# Patient Record
Sex: MALE | Race: AMERICAN INDIAN OR ALASKA NATIVE
[De-identification: names, ages, dates, MRNs, and addresses within clinical notes are randomized per-mention and may not be internally consistent; named-entity substitution may affect disease eponyms.]

---

## 2018-04-16 ENCOUNTER — HOSPITAL ENCOUNTER (OUTPATIENT)
Dept: HOSPITAL 14 - H.ER | Age: 53
Setting detail: OBSERVATION
LOS: 1 days | Discharge: HOME | End: 2018-04-17
Attending: FAMILY MEDICINE | Admitting: FAMILY MEDICINE
Payer: MEDICAID

## 2018-04-16 VITALS — OXYGEN SATURATION: 95 %

## 2018-04-16 VITALS — BODY MASS INDEX: 35.6 KG/M2

## 2018-04-16 DIAGNOSIS — Z72.0: ICD-10-CM

## 2018-04-16 DIAGNOSIS — J45.909: ICD-10-CM

## 2018-04-16 DIAGNOSIS — D69.6: ICD-10-CM

## 2018-04-16 DIAGNOSIS — Z23: ICD-10-CM

## 2018-04-16 DIAGNOSIS — I82.411: ICD-10-CM

## 2018-04-16 DIAGNOSIS — I10: ICD-10-CM

## 2018-04-16 DIAGNOSIS — I82.431: ICD-10-CM

## 2018-04-16 DIAGNOSIS — L03.115: Primary | ICD-10-CM

## 2018-04-16 LAB
ALBUMIN SERPL-MCNC: 3.9 G/DL (ref 3.5–5)
ALBUMIN/GLOB SERPL: 1 {RATIO} (ref 1–2.1)
ALT SERPL-CCNC: 50 U/L (ref 21–72)
AST SERPL-CCNC: 50 U/L (ref 17–59)
BASOPHILS # BLD AUTO: 0.1 K/UL (ref 0–0.2)
BASOPHILS NFR BLD: 1.1 % (ref 0–2)
BUN SERPL-MCNC: 15 MG/DL (ref 9–20)
CALCIUM SERPL-MCNC: 9.2 MG/DL (ref 8.4–10.2)
EOSINOPHIL # BLD AUTO: 0.4 K/UL (ref 0–0.7)
EOSINOPHIL NFR BLD: 5.4 % (ref 0–4)
ERYTHROCYTE [DISTWIDTH] IN BLOOD BY AUTOMATED COUNT: 16.3 % (ref 11.5–14.5)
GFR NON-AFRICAN AMERICAN: > 60
HGB BLD-MCNC: 13.9 G/DL (ref 12–18)
LYMPHOCYTES # BLD AUTO: 2.8 K/UL (ref 1–4.3)
LYMPHOCYTES NFR BLD AUTO: 37.9 % (ref 20–40)
MCH RBC QN AUTO: 27.4 PG (ref 27–31)
MCHC RBC AUTO-ENTMCNC: 33.5 G/DL (ref 33–37)
MCV RBC AUTO: 82 FL (ref 80–94)
MONOCYTES # BLD: 0.5 K/UL (ref 0–0.8)
MONOCYTES NFR BLD: 6.5 % (ref 0–10)
NEUTROPHILS # BLD: 3.6 K/UL (ref 1.8–7)
NEUTROPHILS NFR BLD AUTO: 49.1 % (ref 50–75)
NRBC BLD AUTO-RTO: 0.2 % (ref 0–0)
PLATELET # BLD: 129 K/UL (ref 130–400)
PMV BLD AUTO: 8.1 FL (ref 7.2–11.7)
RBC # BLD AUTO: 5.07 MIL/UL (ref 4.4–5.9)
WBC # BLD AUTO: 7.4 K/UL (ref 4.8–10.8)

## 2018-04-16 PROCEDURE — 71250 CT THORAX DX C-: CPT

## 2018-04-16 PROCEDURE — 86148 ANTI-PHOSPHOLIPID ANTIBODY: CPT

## 2018-04-16 PROCEDURE — 90732 PPSV23 VACC 2 YRS+ SUBQ/IM: CPT

## 2018-04-16 PROCEDURE — 85730 THROMBOPLASTIN TIME PARTIAL: CPT

## 2018-04-16 PROCEDURE — 90471 IMMUNIZATION ADMIN: CPT

## 2018-04-16 PROCEDURE — 80053 COMPREHEN METABOLIC PANEL: CPT

## 2018-04-16 PROCEDURE — 93005 ELECTROCARDIOGRAM TRACING: CPT

## 2018-04-16 PROCEDURE — 93971 EXTREMITY STUDY: CPT

## 2018-04-16 PROCEDURE — 87040 BLOOD CULTURE FOR BACTERIA: CPT

## 2018-04-16 PROCEDURE — 10021 FNA BX W/O IMG GDN 1ST LES: CPT

## 2018-04-16 PROCEDURE — 85610 PROTHROMBIN TIME: CPT

## 2018-04-16 PROCEDURE — 86147 CARDIOLIPIN ANTIBODY EA IG: CPT

## 2018-04-16 PROCEDURE — 85025 COMPLETE CBC W/AUTO DIFF WBC: CPT

## 2018-04-16 PROCEDURE — 36415 COLL VENOUS BLD VENIPUNCTURE: CPT

## 2018-04-16 PROCEDURE — 99284 EMERGENCY DEPT VISIT MOD MDM: CPT

## 2018-04-16 PROCEDURE — 86146 BETA-2 GLYCOPROTEIN ANTIBODY: CPT

## 2018-04-16 PROCEDURE — 81240 F2 GENE: CPT

## 2018-04-16 PROCEDURE — 81241 F5 GENE: CPT

## 2018-04-16 PROCEDURE — 96372 THER/PROPH/DIAG INJ SC/IM: CPT

## 2018-04-16 NOTE — CP.PCM.CON
History of Present Illness





- History of Present Illness


History of Present Illness: 





52 year old male with a history of HTN, presenting for right lower extremity 

swelling, found to have right superfical femoral and popliteal DVT.  The 

patient reports to a boil on his foot which left him limping for about 2 weeks.

  He felt this led to swelling and discomfort at his calf and thigh.  He denies 

abnormal bleeding and clotting in the past.  He denies long travel or trauma to 

his leg.





Past medical history:  HTN





Past surgical history:  Right leg orthopedic surgery related to childhood 

accident





Family history:  Mother, brother and sisters with blood clots





Social history:  1ppd x 35 years, denies alcohol, and illicit drug use.





Allergies:  NKA





Review of systems:  All remaining review of systems including HEENT, 

cardiovascular, respiratory, gastrointestinal, genitourinary, musculoskeletal, 

dermatologic, neurologic, and psychiatric are negative unless mentioned in the 

HPI.





Past Patient History





- Infectious Disease


Hx of Infectious Diseases: None





- Past Medical History & Family History


Past Medical History?: Yes





- Past Social History


Smoking Status: Heavy Smoker > 10 Cigarettes Daily





- CARDIAC


Hx Cardiac Disorders: Yes


Hx Hypotension: Yes





- PULMONARY


Hx Respiratory Disorders: Yes


Hx Asthma: Yes





- NEUROLOGICAL


Hx Neurological Disorder: No





- HEENT


Hx HEENT Problems: No





- RENAL


Hx Chronic Kidney Disease: No





- ENDOCRINE/METABOLIC


Hx Endocrine Disorders: No





- HEMATOLOGICAL/ONCOLOGICAL


Hx Blood Disorders: No





- INTEGUMENTARY


Hx Dermatological Problems: No





- MUSCULOSKELETAL/RHEUMATOLOGICAL


Hx Musculoskeletal Disorders: No


Hx Falls: No





- GASTROINTESTINAL


Hx Gastrointestinal Disorders: No





- GENITOURINARY/GYNECOLOGICAL


Hx Genitourinary Disorders: No





- PSYCHIATRIC


Hx Psychophysiologic Disorder: No


Hx Substance Use: No





- SURGICAL HISTORY


Hx Surgeries: No





- ANESTHESIA


Hx Anesthesia: No


Hx Anesthesia Reactions: No


Hx Malignant Hyperthermia: No


Has any member of the family had a problem w/ anesthesia?: No





Meds


Allergies/Adverse Reactions: 


 Allergies











Allergy/AdvReac Type Severity Reaction Status Date / Time


 


No Known Allergies Allergy   Verified 06/12/16 11:38














- Medications


Medications: 


 Current Medications





Albuterol (Ventolin Hfa 90 Mcg/Actuation (8 G))  2 puff IH Q6 PRN


   PRN Reason: Shortness of Breath


Enoxaparin Sodium (Lovenox)  120 mg SC Q12 MORENA


   PRN Reason: Protocol


   Last Admin: 04/16/18 21:25 Dose:  Not Given


Home Med (Buprenorphine Hcl/Naloxone Hcl [Suboxone 8 Mg-2 Mg Sl Film])  1 film 

SL Q12 Replaced by [facility]


Metoprolol Tartrate (Lopressor)  50 mg PO Q12 Replaced by [facility]


   Last Admin: 04/16/18 21:29 Dose:  50 mg


Montelukast Sodium (Singulair)  10 mg PO HS Replaced by [facility]


   Last Admin: 04/16/18 21:29 Dose:  10 mg


Pantoprazole Sodium (Protonix Ec Tab)  40 mg PO DAILY Replaced by [facility]











Physical Exam





- Head Exam


Head Exam: ATRAUMATIC





- Eye Exam


Eye Exam: Normal appearance





- ENT Exam


ENT Exam: Mucous Membranes Dry





- Respiratory Exam


Respiratory Exam: NORMAL BREATHING PATTERN





- Cardiovascular Exam


Cardiovascular Exam: +S1, +S2





- GI/Abdominal Exam


GI & Abdominal Exam: Normal Bowel Sounds





- Extremities Exam


Extremities exam: Positive for: pedal edema





- Neurological Exam


Neurological exam: Oriented x3





- Psychiatric Exam


Psychiatric exam: Normal Affect, Normal Mood





- Skin


Skin Exam: Warm





Results





- Vital Signs


Recent Vital Signs: 


 Last Vital Signs











Temp  97.6 F   04/16/18 18:44


 


Pulse  73   04/16/18 21:29


 


Resp  20   04/16/18 18:44


 


BP  112/59 L  04/16/18 21:29


 


Pulse Ox  95   04/16/18 18:44














- Labs


Result Diagrams: 


 04/16/18 14:04





 04/16/18 16:40


Labs: 


 Laboratory Results - last 24 hr











  04/16/18 04/16/18





  14:04 16:40


 


WBC  7.4 


 


RBC  5.07 


 


Hgb  13.9 


 


Hct  41.5 


 


MCV  82.0 


 


MCH  27.4 


 


MCHC  33.5 


 


RDW  16.3 H 


 


Plt Count  129 L 


 


MPV  8.1 


 


Neut % (Auto)  49.1 L 


 


Lymph % (Auto)  37.9 


 


Mono % (Auto)  6.5 


 


Eos % (Auto)  5.4 H 


 


Baso % (Auto)  1.1 


 


Neut # (Auto)  3.6 


 


Lymph # (Auto)  2.8 


 


Mono # (Auto)  0.5 


 


Eos # (Auto)  0.4 


 


Baso # (Auto)  0.1 


 


Sodium   143


 


Potassium   4.2


 


Chloride   102


 


Carbon Dioxide   27


 


Anion Gap   18


 


BUN   15


 


Creatinine   0.9


 


Est GFR ( Amer)   > 60


 


Est GFR (Non-Af Amer)   > 60


 


Random Glucose   89


 


Calcium   9.2


 


Total Bilirubin   1.0


 


AST   50


 


ALT   50


 


Alkaline Phosphatase   91


 


Total Protein   7.8


 


Albumin   3.9


 


Globulin   3.9


 


Albumin/Globulin Ratio   1.0














Assessment & Plan


(1) DVT (deep venous thrombosis)


Assessment and Plan: 


? provoked from decreased mobility from foot pain


given family history, will check inherited thrombophilia w/u


given extensive smoking history, will check CT chest to rule out occult 

malignancy


outpatient cancer surveillance with colonoscopy and PSA


on therapeutic anticoagulation


outpatient NOAC





Status: Acute   





(2) Thrombocytopenia


Assessment and Plan: 


mild


cont. to monitor


?low grade ITP


Status: Acute   





(3) Tobacco abuse


Assessment and Plan: 


smoking cessation discussed at length





Thank you for this interesting consult.


Status: Acute

## 2018-04-16 NOTE — US
PROCEDURE:  Right lower extremity venous duplex Doppler. 



HISTORY:

r/o DVT







COMPARISON:

None available.



TECHNIQUE:

Common femoral, superficial femoral, popliteal and posterior tibial 

veins were evaluated. Flow was assessed with color Doppler, 

compressibility, assessment of phasic flow and augmentation response. 



FINDINGS:



COMMON FEMORAL VEIN:

Unremarkable.



SUPERFICIAL FEMORAL VEIN:

Proximal SFV is compressible, augments and exhibits normal phasic 

color Doppler blood flow with no suspicious grayscale findings.  

However, intermediate echoes are are seen throughout the visualize 

lumen of the mid to distal SFV with no color Doppler blood flow or 

compressibility of this segment compatible with occlusive thrombosis.



POPLITEAL VEIN:

Occlusive thrombosis identified here identical to that seen in the 

mid to distal right SFV, manifest by noncompressibility, intermediate 

echogenicity in the lumen and lack of spontaneous color Doppler blood 

flow.



POSTERIOR TIBIAL VEIN:

Unremarkable.



OTHER FINDINGS:

None.



IMPRESSION:

Occlusive thrombosis at mid to distal right superficial femoral vein 

and the entire popliteal vein.



Findings discussed with Dr. Villalta 04/16/2018 4:10 p.m. with written 

down and read back verification.

## 2018-04-16 NOTE — ED PDOC
Lower Extremity Pain/Injury


Time Seen by Provider: 04/16/18 13:32


Chief Complaint (Nursing): Lower Extremity Problem/Injury


History Per: Patient


History/Exam Limitations: no limitations


Onset/Duration Of Symptoms: Other (x 1 week)


Current Symptoms Are (Timing): Still Present


Additional Complaint(s): 





52-year-old male, with a history of Hypertension, presents to ED complaining of 

swelling and redness to right calf x 1 week. Denies fever, chest pain or 

shortness of breath. No Trauma.














PMD: Betito Roberson








Past Medical History


Reviewed: Historical Data, Nursing Documentation, Vital Signs


Vital Signs: 





 Last Vital Signs











Temp  98.8 F   04/16/18 13:27


 


Pulse  82   04/16/18 13:27


 


Resp  18   04/16/18 13:27


 


BP  146/86   04/16/18 13:27


 


Pulse Ox  99   04/16/18 13:27














- Medical History


PMH: Asthma, HTN





- Surgical History


Surgical History: No Surg Hx





- Family History


Family History: States: Unknown Family Hx





- Home Medications


Home Medications: 


 Ambulatory Orders











 Medication  Instructions  Recorded


 


Albuterol Sulfate [Albuterol 3 ml IH Q4 #1 sol 08/24/14





Sulfate 2.5mg/3 ml 0.083%]  


 


Albuterol [Proventil] 0.09 mg IH Q4 #1 pkg 08/24/14


 


Azithromycin 250 mg PO DAILY #0 tab 08/24/14


 


Albuterol Sulfate [Proair Hfa] 0.09 mg IH Q6H PRN #2 inh 12/08/15


 


Azithromycin [Zithromax Z-Sahil] 250 mg PO DAILY 5 Days  tab 12/08/15


 


Prednisone 20 mg PO BID #10 tab 12/08/15


 


Albuterol HFA [Ventolin HFA 90 2 puff IH [id number] #1 puff 03/24/16





mcg/actuation (8 g)]  


 


Fluticasone/Salmeterol 250/50 1 puff IH Q12 #1 puff 03/24/16





[Advair Diskus]  


 


Albuterol 0.042% [Albuterol 0.042% 3 ml IH Q6 #1 packet 06/03/16





Inhal Sol (1.25mg/3ml) UD]  


 


Cyclobenzaprine [Cyclobenzaprine 10 mg PO TID #20 tab 06/03/16





HCl]  


 


Ibuprofen [Motrin] 600 mg PO Q6 #20 tab 06/03/16


 


Albuterol 0.042% [Albuterol 0.042% 3 ml IH Q6 #1 packet 06/12/16





Inhal Sol (1.25mg/3ml) UD]  


 


Guaifenesin/Pseudoephedrne HCl 1 tab PO DAILY PRN #30 ter 06/12/16





[Mucinex D 600 mg-60 mg]  


 


Promethazine HCl/Codeine 5 ml PO HS #80 ml 06/12/16





[Promethazine HCl-Codeine  





Phosphate 10 mg/5 ml]  


 


Albuterol HFA [Ventolin HFA 90 2 puff IH [id number] PRN #0 puff 08/14/16





mcg/actuation (8 g)]  


 


Metoprolol Tartrate 50 mg PO DAILY #30 tablet 08/14/16














- Allergies


Allergies/Adverse Reactions: 


 Allergies











Allergy/AdvReac Type Severity Reaction Status Date / Time


 


No Known Allergies Allergy   Verified 06/12/16 11:38














Review of Systems


ROS Statement: Except As Marked, All Systems Reviewed And Found Negative


Constitutional: Negative for: Fever


Cardiovascular: Negative for: Chest Pain


Respiratory: Negative for: Shortness of Breath


Musculoskeletal: Positive for: Other (Swelling and redness to right calf)





Physical Exam





- Reviewed


Nursing Documentation Reviewed: Yes


Vital Signs Reviewed: Yes





- Physical Exam


Cardiovascular/Chest: Positive for: Regular Rate, Rhythm


Respiratory: Positive for: Normal Breath Sounds.  Negative for: Respiratory 

Distress


Extremity: Positive for: Swelling (Right Lower Extremity: swelling and erythema 

circumstantially from mid-shin to ankle).  Negative for: Calf Tenderness, Other 

(Homans sign)





- Laboratory Results


Result Diagrams: 


 04/16/18 14:04








- ECG


O2 Sat by Pulse Oximetry: 99 (RA)


Pulse Ox Interpretation: Normal





Medical Decision Making


Medical Decision Making: 





Time: 13:37


Plan:


- EKG


- CMP


- CBC


- Cleocin 600 mg Sodium Chloride 0.9% 50 ml IVPB


- Blood Culture


- Duplex Lower Extremity Vein Right Ultrasound

















--------------------------------------------------------------------------------

-----------------


Scribe Attestation:


Documented by Aureliano Alvarez, acting as a scribe for Bassem Villalta MD





Provider Scribe Attestation:


All medical record entries made by the Scribe were at my direction and 

personally dictated by me. I have reviewed the chart and agree that the record 

accurately reflects my personal performance of the history, physical exam, 

medical decision making, and the department course for this patient. I have 

also personally directed, reviewed, and agree with the discharge instructions 

and disposition.





Disposition





- Clinical Impression


Clinical Impression: 


 DVT (deep venous thrombosis)








- Patient ED Disposition


Is Patient to be Admitted: Yes





- Disposition


Disposition Time: 15:49


Condition: FAIR


Forms:  [facility] (English)





- Pt Status Changed To:


Hospital Disposition Of: Observation





- POA


Present On Arrival: None

## 2018-04-17 VITALS
RESPIRATION RATE: 20 BRPM | HEART RATE: 70 BPM | SYSTOLIC BLOOD PRESSURE: 104 MMHG | TEMPERATURE: 98 F | DIASTOLIC BLOOD PRESSURE: 68 MMHG

## 2018-04-17 LAB
ALBUMIN SERPL-MCNC: 3.4 G/DL (ref 3.5–5)
ALBUMIN/GLOB SERPL: 0.9 {RATIO} (ref 1–2.1)
ALT SERPL-CCNC: 43 U/L (ref 21–72)
APTT BLD: 36.8 SECONDS (ref 25.6–37.1)
AST SERPL-CCNC: 40 U/L (ref 17–59)
BASOPHILS # BLD AUTO: 0 K/UL (ref 0–0.2)
BASOPHILS NFR BLD: 0.7 % (ref 0–2)
BUN SERPL-MCNC: 17 MG/DL (ref 9–20)
CALCIUM SERPL-MCNC: 9.2 MG/DL (ref 8.4–10.2)
EOSINOPHIL # BLD AUTO: 0.4 K/UL (ref 0–0.7)
EOSINOPHIL NFR BLD: 7.9 % (ref 0–4)
ERYTHROCYTE [DISTWIDTH] IN BLOOD BY AUTOMATED COUNT: 16.5 % (ref 11.5–14.5)
GFR NON-AFRICAN AMERICAN: > 60
HGB BLD-MCNC: 13.5 G/DL (ref 12–18)
INR PPP: 1 (ref 0.9–1.2)
LYMPHOCYTES # BLD AUTO: 2 K/UL (ref 1–4.3)
LYMPHOCYTES NFR BLD AUTO: 36.9 % (ref 20–40)
MCH RBC QN AUTO: 27.5 PG (ref 27–31)
MCHC RBC AUTO-ENTMCNC: 33.5 G/DL (ref 33–37)
MCV RBC AUTO: 81.9 FL (ref 80–94)
MONOCYTES # BLD: 0.4 K/UL (ref 0–0.8)
MONOCYTES NFR BLD: 7.6 % (ref 0–10)
NEUTROPHILS # BLD: 2.5 K/UL (ref 1.8–7)
NEUTROPHILS NFR BLD AUTO: 46.9 % (ref 50–75)
NRBC BLD AUTO-RTO: 0.2 % (ref 0–0)
PLATELET # BLD: 145 K/UL (ref 130–400)
PMV BLD AUTO: 8.5 FL (ref 7.2–11.7)
PROTHROMBIN TIME: 11.4 SECONDS (ref 9.8–13.1)
RBC # BLD AUTO: 4.92 MIL/UL (ref 4.4–5.9)
WBC # BLD AUTO: 5.3 K/UL (ref 4.8–10.8)

## 2018-04-17 NOTE — CP.PCM.DIS
Provider





- Provider


Date of Admission: 


04/16/18 15:48





Attending physician: 


Patel Roberson MD





Time Spent in preparation of Discharge (in minutes): 15





Diagnosis





- Discharge Diagnosis


(1) Cellulitis


Status: Acute   





(2) DVT (deep venous thrombosis)


Status: Acute   





(3) Thrombocytopenia


Status: Acute   





(4) Tobacco abuse


Status: Acute   





Hospital Course





- Lab Results


Lab Results: 


 Micro Results





04/16/18 14:04   Blood   Blood Culture - Preliminary


                            NO GROWTH AFTER 24 HOURS





 Most Recent Lab Values











WBC  5.3 K/uL (4.8-10.8)   04/17/18  05:30    


 


RBC  4.92 Mil/uL (4.40-5.90)   04/17/18  05:30    


 


Hgb  13.5 g/dL (12.0-18.0)   04/17/18  05:30    


 


Hct  40.3 % (35.0-51.0)   04/17/18  05:30    


 


MCV  81.9 fl (80.0-94.0)   04/17/18  05:30    


 


MCH  27.5 pg (27.0-31.0)   04/17/18  05:30    


 


MCHC  33.5 g/dL (33.0-37.0)   04/17/18  05:30    


 


RDW  16.5 % (11.5-14.5)  H  04/17/18  05:30    


 


Plt Count  145 K/uL (130-400)   04/17/18  05:30    


 


MPV  8.5 fl (7.2-11.7)   04/17/18  05:30    


 


Neut % (Auto)  46.9 % (50.0-75.0)  L  04/17/18  05:30    


 


Lymph % (Auto)  36.9 % (20.0-40.0)   04/17/18  05:30    


 


Mono % (Auto)  7.6 % (0.0-10.0)   04/17/18  05:30    


 


Eos % (Auto)  7.9 % (0.0-4.0)  H  04/17/18  05:30    


 


Baso % (Auto)  0.7 % (0.0-2.0)   04/17/18  05:30    


 


Neut # (Auto)  2.5 K/uL (1.8-7.0)   04/17/18  05:30    


 


Lymph # (Auto)  2.0 K/uL (1.0-4.3)   04/17/18  05:30    


 


Mono # (Auto)  0.4 K/uL (0.0-0.8)   04/17/18  05:30    


 


Eos # (Auto)  0.4 K/uL (0.0-0.7)   04/17/18  05:30    


 


Baso # (Auto)  0.0 K/uL (0.0-0.2)   04/17/18  05:30    


 


PT  11.4 Seconds (9.8-13.1)   04/17/18  05:30    


 


INR  1.0  (0.9-1.2)   04/17/18  05:30    


 


APTT  36.8 Seconds (25.6-37.1)   04/17/18  05:30    


 


Sodium  142 mmol/l (132-148)   04/17/18  05:30    


 


Potassium  4.4 MMOL/L (3.6-5.0)   04/17/18  05:30    


 


Chloride  99 mmol/L ()   04/17/18  05:30    


 


Carbon Dioxide  30 mmol/L (22-30)   04/17/18  05:30    


 


Anion Gap  17  (10-20)   04/17/18  05:30    


 


BUN  17 mg/dl (9-20)   04/17/18  05:30    


 


Creatinine  1.0 mg/dl (0.8-1.5)   04/17/18  05:30    


 


Est GFR ( Amer)  > 60   04/17/18  05:30    


 


Est GFR (Non-Af Amer)  > 60   04/17/18  05:30    


 


Random Glucose  112 mg/dL ()  H  04/17/18  05:30    


 


Calcium  9.2 mg/dL (8.4-10.2)   04/17/18  05:30    


 


Total Bilirubin  1.1 mg/dl (0.2-1.3)   04/17/18  05:30    


 


AST  40 U/L (17-59)   04/17/18  05:30    


 


ALT  43 U/L (21-72)   04/17/18  05:30    


 


Alkaline Phosphatase  80 U/L ()   04/17/18  05:30    


 


Total Protein  7.2 G/DL (6.3-8.2)   04/17/18  05:30    


 


Albumin  3.4 g/dL (3.5-5.0)  L  04/17/18  05:30    


 


Globulin  3.8 gm/dL (2.2-3.9)   04/17/18  05:30    


 


Albumin/Globulin Ratio  0.9  (1.0-2.1)  L  04/17/18  05:30    














- Hospital Course


Hospital Course: 





lovenox


ct chest, heme/onc


podiatry, foot care





Discharge Exam





- Head Exam


Head Exam: ATRAUMATIC, NORMAL INSPECTION, NORMOCEPHALIC





Discharge Plan





- Discharge Medications


Prescriptions: 


Acidoph/L.bulg/Bif.b/S.thermop [Bacid Probiotic 5%-80%-10%-5%] 1 tab PO BID #28 

tab


Apixaban [Eliquis] 5 mg PO BID #60 tab


Clindamycin [Cleocin] 300 mg PO Q6 #28 cap





- Follow Up Plan


Condition: FAIR


Disposition: HOME/ ROUTINE


Instructions:  Deep Vein Thrombosis (Blood Clots in the Legs)


Additional Instructions: 


final dx-dvt, r foot wound, rle cellulitis


f/u heme/onc nad podiatry outpt. rted prn, med sper med rec, meds e-rx


f/u rmg in am


pt doing well w/o dyspnea, calf pain. 


Referrals: 


Hardeep Bermudez MD [Staff Provider] -

## 2018-04-17 NOTE — CARD
--------------- APPROVED REPORT --------------





EKG Measurement

Heart [id number]

NC [id number]

[id number]

[id number]

[id number]



<Conclusion>

Normal sinus rhythm

ST elevation, probably due to early repolarization

Borderline ECG

## 2018-04-17 NOTE — CP.PCM.HP
History of Present Illness





- History of Present Illness


History of Present Illness: 





pt admitted for rle dvt w/ ?? superimposed cellulitis. no f/c, n/v/d. bw noted, 

imaging noted. heme/onc consult appriciated.


penidng podiatry consult and ct chest.


kayy lovenox and clinda well





Present on Admission





- Present on Admission


Any Indicators Present on Admission: No





Review of Systems





- Integumentary


Integumentary: As Per HPI, Erythema, Swelling





Past Patient History





- Infectious Disease


Hx of Infectious Diseases: None





- Past Medical History & Family History


Past Medical History?: Yes





- Past Social History


Smoking Status: Heavy Smoker > 10 Cigarettes Daily





- CARDIAC


Hx Cardiac Disorders: Yes


Hx Hypotension: Yes





- PULMONARY


Hx Respiratory Disorders: Yes


Hx Asthma: Yes





- NEUROLOGICAL


Hx Neurological Disorder: No





- HEENT


Hx HEENT Problems: No





- RENAL


Hx Chronic Kidney Disease: No





- ENDOCRINE/METABOLIC


Hx Endocrine Disorders: No





- HEMATOLOGICAL/ONCOLOGICAL


Hx Blood Disorders: No





- INTEGUMENTARY


Hx Dermatological Problems: No





- MUSCULOSKELETAL/RHEUMATOLOGICAL


Hx Musculoskeletal Disorders: No


Hx Falls: No





- GASTROINTESTINAL


Hx Gastrointestinal Disorders: No





- GENITOURINARY/GYNECOLOGICAL


Hx Genitourinary Disorders: No





- PSYCHIATRIC


Hx Psychophysiologic Disorder: No


Hx Substance Use: No





- SURGICAL HISTORY


Hx Surgeries: No





- ANESTHESIA


Hx Anesthesia: No


Hx Anesthesia Reactions: No


Hx Malignant Hyperthermia: No


Has any member of the family had a problem w/ anesthesia?: No





Meds


Allergies/Adverse Reactions: 


 Allergies











Allergy/AdvReac Type Severity Reaction Status Date / Time


 


No Known Allergies Allergy   Verified 06/12/16 11:38














Physical Exam





- Constitutional


Appears: Well, Non-toxic, No Acute Distress





- Head Exam


Head Exam: ATRAUMATIC, NORMAL INSPECTION, NORMOCEPHALIC





- Eye Exam


Eye Exam: EOMI, Normal appearance, PERRL


Pupil Exam: NORMAL ACCOMODATION, PERRL





- ENT Exam


ENT Exam: Mucous Membranes Moist, Normal Exam





- Neck Exam


Neck exam: Positive for: Normal Inspection





- Respiratory Exam


Respiratory Exam: Clear to Auscultation Bilateral, NORMAL BREATHING PATTERN





- Cardiovascular Exam


Cardiovascular Exam: REGULAR RHYTHM, RRR, +S1, +S2 (d)





- GI/Abdominal Exam


GI & Abdominal Exam: Normal Bowel Sounds, Soft.  absent: Tenderness





- Extremities Exam


Extremities exam: Positive for: full ROM, normal capillary refill, normal 

inspection, pedal edema, tenderness, pedal pulses present


Additional comments: 





r calf edema, tenderness, positive homans, erythma, warmth





- Back Exam


Back exam: NORMAL INSPECTION





- Neurological Exam


Neurological exam: Alert, CN II-XII Intact, Normal Gait, Oriented x3, Reflexes 

Normal





- Psychiatric Exam


Psychiatric exam: Normal Affect, Normal Mood





- Skin


Skin Exam: Dry, Intact, Normal Color, Warm





Results





- Vital Signs


Recent Vital Signs: 





 Last Vital Signs











Temp  97.8 F   04/17/18 00:23


 


Pulse  68   04/17/18 00:23


 


Resp  18   04/17/18 00:23


 


BP  96/60 L  04/17/18 00:23


 


Pulse Ox  95   04/17/18 00:23














- Labs


Result Diagrams: 


 04/17/18 05:30





 04/17/18 05:30


Labs: 





 Laboratory Results - last 24 hr











  04/16/18 04/16/18 04/17/18





  14:04 16:40 05:30


 


WBC  7.4   5.3


 


RBC  5.07   4.92


 


Hgb  13.9   13.5


 


Hct  41.5   40.3


 


MCV  82.0   81.9


 


MCH  27.4   27.5


 


MCHC  33.5   33.5


 


RDW  16.3 H   16.5 H


 


Plt Count  129 L   145


 


MPV  8.1   8.5


 


Neut % (Auto)  49.1 L   46.9 L


 


Lymph % (Auto)  37.9   36.9


 


Mono % (Auto)  6.5   7.6


 


Eos % (Auto)  5.4 H   7.9 H


 


Baso % (Auto)  1.1   0.7


 


Neut # (Auto)  3.6   2.5


 


Lymph # (Auto)  2.8   2.0


 


Mono # (Auto)  0.5   0.4


 


Eos # (Auto)  0.4   0.4


 


Baso # (Auto)  0.1   0.0


 


PT   


 


INR   


 


APTT   


 


Sodium   143 


 


Potassium   4.2 


 


Chloride   102 


 


Carbon Dioxide   27 


 


Anion Gap   18 


 


BUN   15 


 


Creatinine   0.9 


 


Est GFR ( Amer)   > 60 


 


Est GFR (Non-Af Amer)   > 60 


 


Random Glucose   89 


 


Calcium   9.2 


 


Total Bilirubin   1.0 


 


AST   50 


 


ALT   50 


 


Alkaline Phosphatase   91 


 


Total Protein   7.8 


 


Albumin   3.9 


 


Globulin   3.9 


 


Albumin/Globulin Ratio   1.0 














  04/17/18 04/17/18





  05:30 05:30


 


WBC  


 


RBC  


 


Hgb  


 


Hct  


 


MCV  


 


MCH  


 


MCHC  


 


RDW  


 


Plt Count  


 


MPV  


 


Neut % (Auto)  


 


Lymph % (Auto)  


 


Mono % (Auto)  


 


Eos % (Auto)  


 


Baso % (Auto)  


 


Neut # (Auto)  


 


Lymph # (Auto)  


 


Mono # (Auto)  


 


Eos # (Auto)  


 


Baso # (Auto)  


 


PT   11.4


 


INR   1.0


 


APTT   36.8


 


Sodium  142 


 


Potassium  4.4 


 


Chloride  99 


 


Carbon Dioxide  30 


 


Anion Gap  17 


 


BUN  17 


 


Creatinine  1.0 


 


Est GFR ( Amer)  > 60 


 


Est GFR (Non-Af Amer)  > 60 


 


Random Glucose  112 H 


 


Calcium  9.2 


 


Total Bilirubin  1.1 


 


AST  40 


 


ALT  43 


 


Alkaline Phosphatase  80 


 


Total Protein  7.2 


 


Albumin  3.4 L 


 


Globulin  3.8 


 


Albumin/Globulin Ratio  0.9 L 














Assessment & Plan


(1) Cellulitis


Assessment and Plan: 


rle-clinda, podiatry


Status: Acute   





(2) DVT (deep venous thrombosis)


Assessment and Plan: 


lovenox


heme/onc


ct chest


Status: Acute   





(3) Thrombocytopenia


Assessment and Plan: 


heme/onc


Status: Acute   





(4) Tobacco abuse


Assessment and Plan: 


nicoderm patch


will work w/ pt outpt for quitting


Status: Acute   





Decision To Admit





- Pt Status Changed To:


Hospital Disposition Of: Observation





- .


Bed Request Type: Med/Surg


Admitting Physician: Patel Roberson

## 2018-04-17 NOTE — CT
PROCEDURE:  CT Chest without contrast



HISTORY:

rule out occult malignancy; smoking and DVT



COMPARISON:

None.



TECHNIQUE:

Contiguous axial images were obtained through the chest without 

intravenous contrast enhancement. Sagittal and coronal 

reconstructions were performed.







Radiation dose (DLP): 823.59 mGy-cm. 



This CT exam was performed using one or more of the following dose 

reduction techniques: Automated exposure control, adjustment of the 

mA and/or kV according to patient size, and/or use of iterative 

reconstruction technique.



FINDINGS:



LUNGS:

Clear lungs. Visualized airway clear. 



MEDIASTINUM:

Unremarkable thoracic aorta. No aneurysm. Normal sized heart. Main 

pulmonary artery unremarkable. No vascular congestion. No 

lymphadenopathy.



PLEURA:

No pleural fluid. No pneumothorax.



BONES:

No fracture. No destructive lesion. 



UPPER ABDOMEN:

Grossly unremarkable.



OTHER FINDINGS:

None.



IMPRESSION:

Unremarkable non-contrast enhanced CT of the chest.

## 2018-04-17 NOTE — CP.PCM.CON
History of Present Illness





- History of Present Illness


History of Present Illness: 





Podiatry Consult Note - Dr. Mirza





52M PMHx HTN seen and evaluated at bedside concerning a right foot blister. 

Patient resting in bed comfortably, NAD. Patient states he first developed the 

blister about 1 week ago from his boots. Patient reports after his right lower 

extremity swelled, his foot became too swollen for his boots and felt constant 

friction on the bottom of his foot. Patient states he has been putting unknown 

OTC compound cream for treatment. Reports pain only with ambulation. Denies N/V/

F/D/C/SOB/calf pain. Offers no other complaints.





Review of Systems





- Review of Systems


All systems: reviewed and no additional remarkable complaints except (as per HPI

)





Past Patient History





- Infectious Disease


Hx of Infectious Diseases: None





- Past Medical History & Family History


Past Medical History?: Yes





- Past Social History


Smoking Status: Heavy Smoker > 10 Cigarettes Daily





- CARDIAC


Hx Cardiac Disorders: Yes


Hx Hypotension: Yes





- PULMONARY


Hx Respiratory Disorders: Yes


Hx Asthma: Yes





- NEUROLOGICAL


Hx Neurological Disorder: No





- HEENT


Hx HEENT Problems: No





- RENAL


Hx Chronic Kidney Disease: No





- ENDOCRINE/METABOLIC


Hx Endocrine Disorders: No





- HEMATOLOGICAL/ONCOLOGICAL


Hx Blood Disorders: No





- INTEGUMENTARY


Hx Dermatological Problems: No





- MUSCULOSKELETAL/RHEUMATOLOGICAL


Hx Musculoskeletal Disorders: No


Hx Falls: No





- GASTROINTESTINAL


Hx Gastrointestinal Disorders: No





- GENITOURINARY/GYNECOLOGICAL


Hx Genitourinary Disorders: No





- PSYCHIATRIC


Hx Psychophysiologic Disorder: No


Hx Substance Use: No





- SURGICAL HISTORY


Hx Surgeries: No





- ANESTHESIA


Hx Anesthesia: No


Hx Anesthesia Reactions: No


Hx Malignant Hyperthermia: No


Has any member of the family had a problem w/ anesthesia?: No





Meds


Allergies/Adverse Reactions: 


 Allergies











Allergy/AdvReac Type Severity Reaction Status Date / Time


 


No Known Allergies Allergy   Verified 06/12/16 11:38














- Medications


Medications: 


 Current Medications





Albuterol (Ventolin Hfa 90 Mcg/Actuation (8 G))  2 puff IH Q6 PRN


   PRN Reason: Shortness of Breath


Enoxaparin Sodium (Lovenox)  120 mg SC Q12@0600,1800 MORENA


   PRN Reason: Protocol


   Last Admin: 04/17/18 06:34 Dose:  120 mg


Home Med (Buprenorphine Hcl/Naloxone Hcl [Suboxone 8 Mg-2 Mg Sl Film])  1 film 

SL Q12 [facility]


Metoprolol Tartrate (Lopressor)  50 mg PO Q12 [facility]


   Last Admin: 04/16/18 21:29 Dose:  50 mg


Montelukast Sodium (Singulair)  10 mg PO HS [facility]


   Last Admin: 04/16/18 21:29 Dose:  10 mg


Pantoprazole Sodium (Protonix Ec Tab)  40 mg PO DAILY [facility]











Physical Exam





- Constitutional


Appears: Well, Non-toxic, No Acute Distress





- Extremities Exam


Additional comments: 





RLE focused physical exam:





VASC: DP and PT pulses palpable 2/4. CFT <3 seconds to all digits. Temperature 

gradient cool to cool. Nonpitting edema noted to RLE.





NEURO: Light touch, motor, and protective sensation intact.





DERM: Serous-filled blister measuring approximately 0.5 x 1cm noted proximal 

sub 1st met head. Hyperkeratotic lesion sub 2nd met head.





ORTHO: Pain on palpation to blister. Muscle strength 5/5 for all dorsiflexors, 

plantarflexors, inverters, everters.





- Neurological Exam


Neurological exam: Alert, Oriented x3





- Psychiatric Exam


Psychiatric exam: Normal Affect, Normal Mood





Results





- Vital Signs


Recent Vital Signs: 


 Last Vital Signs











Temp  97.8 F   04/17/18 00:23


 


Pulse  68   04/17/18 00:23


 


Resp  18   04/17/18 00:23


 


BP  96/60 L  04/17/18 00:23


 


Pulse Ox  95   04/17/18 00:23














- Labs


Result Diagrams: 


 04/17/18 05:30





 04/17/18 05:30


Labs: 


 Laboratory Results - last 24 hr











  04/16/18 04/16/18 04/17/18





  14:04 16:40 05:30


 


WBC  7.4   5.3


 


RBC  5.07   4.92


 


Hgb  13.9   13.5


 


Hct  41.5   40.3


 


MCV  82.0   81.9


 


MCH  27.4   27.5


 


MCHC  33.5   33.5


 


RDW  16.3 H   16.5 H


 


Plt Count  129 L   145


 


MPV  8.1   8.5


 


Neut % (Auto)  49.1 L   46.9 L


 


Lymph % (Auto)  37.9   36.9


 


Mono % (Auto)  6.5   7.6


 


Eos % (Auto)  5.4 H   7.9 H


 


Baso % (Auto)  1.1   0.7


 


Neut # (Auto)  3.6   2.5


 


Lymph # (Auto)  2.8   2.0


 


Mono # (Auto)  0.5   0.4


 


Eos # (Auto)  0.4   0.4


 


Baso # (Auto)  0.1   0.0


 


PT   


 


INR   


 


APTT   


 


Sodium   143 


 


Potassium   4.2 


 


Chloride   102 


 


Carbon Dioxide   27 


 


Anion Gap   18 


 


BUN   15 


 


Creatinine   0.9 


 


Est GFR ( Amer)   > 60 


 


Est GFR (Non-Af Amer)   > 60 


 


Random Glucose   89 


 


Calcium   9.2 


 


Total Bilirubin   1.0 


 


AST   50 


 


ALT   50 


 


Alkaline Phosphatase   91 


 


Total Protein   7.8 


 


Albumin   3.9 


 


Globulin   3.9 


 


Albumin/Globulin Ratio   1.0 














  04/17/18 04/17/18





  05:30 05:30


 


WBC  


 


RBC  


 


Hgb  


 


Hct  


 


MCV  


 


MCH  


 


MCHC  


 


RDW  


 


Plt Count  


 


MPV  


 


Neut % (Auto)  


 


Lymph % (Auto)  


 


Mono % (Auto)  


 


Eos % (Auto)  


 


Baso % (Auto)  


 


Neut # (Auto)  


 


Lymph # (Auto)  


 


Mono # (Auto)  


 


Eos # (Auto)  


 


Baso # (Auto)  


 


PT   11.4


 


INR   1.0


 


APTT   36.8


 


Sodium  142 


 


Potassium  4.4 


 


Chloride  99 


 


Carbon Dioxide  30 


 


Anion Gap  17 


 


BUN  17 


 


Creatinine  1.0 


 


Est GFR ( Amer)  > 60 


 


Est GFR (Non-Af Amer)  > 60 


 


Random Glucose  112 H 


 


Calcium  9.2 


 


Total Bilirubin  1.1 


 


AST  40 


 


ALT  43 


 


Alkaline Phosphatase  80 


 


Total Protein  7.2 


 


Albumin  3.4 L 


 


Globulin  3.8 


 


Albumin/Globulin Ratio  0.9 L 














Assessment & Plan





- Assessment and Plan (Free Text)


Assessment: 





52M PMHx HTN with friction blister to right foot, stable


Plan: 





Patient seen and evaluated


Discussed with attending, Dr. Hermes Juan, WBC WNL


Blister lanced using a #18 gauge needle without incident, able to express 

approximately 0.1cc serous fluid - dressed with xeroform, DSD


Blister appears stable at this time, will continue to monitor


Podiatry will continue to follow

## 2018-04-22 ENCOUNTER — HOSPITAL ENCOUNTER (EMERGENCY)
Dept: HOSPITAL 14 - H.ER | Age: 53
Discharge: HOME | End: 2018-04-22
Payer: MEDICAID

## 2018-04-22 VITALS — BODY MASS INDEX: 35.6 KG/M2

## 2018-04-22 VITALS
SYSTOLIC BLOOD PRESSURE: 116 MMHG | OXYGEN SATURATION: 100 % | RESPIRATION RATE: 19 BRPM | HEART RATE: 87 BPM | DIASTOLIC BLOOD PRESSURE: 54 MMHG

## 2018-04-22 VITALS — TEMPERATURE: 99 F

## 2018-04-22 DIAGNOSIS — K59.00: ICD-10-CM

## 2018-04-22 DIAGNOSIS — G89.29: ICD-10-CM

## 2018-04-22 DIAGNOSIS — I10: ICD-10-CM

## 2018-04-22 DIAGNOSIS — R51: Primary | ICD-10-CM

## 2018-04-22 DIAGNOSIS — J45.909: ICD-10-CM

## 2018-04-22 DIAGNOSIS — Z79.01: ICD-10-CM

## 2018-04-22 PROCEDURE — 99283 EMERGENCY DEPT VISIT LOW MDM: CPT

## 2018-04-22 PROCEDURE — 70450 CT HEAD/BRAIN W/O DYE: CPT

## 2018-04-22 PROCEDURE — 96372 THER/PROPH/DIAG INJ SC/IM: CPT

## 2018-04-22 NOTE — ED PDOC
HPI: General Adult


Time Seen by Provider: 04/22/18 13:19


Chief Complaint (Nursing): GI Problem


Chief Complaint (Provider): headache and constipation 


History Per: Patient


History/Exam Limitations: no limitations


Current Symptoms Are (Timing): Still Present


Additional Complaint(s): 


52 year old male with a past medical history of chronic pain and opioid use 

presents to the ED complaining of headache and constipation. Patient was 

recently admitted for DVT in leg and took eliquis blood thinner medication and 

developed a headache, has not had a bowel movement and feels discomfortable. 

Denies nausea, vomiting, and abdominal pain.





PMD: Patel Roberson








Past Medical History


Reviewed: Historical Data, Nursing Documentation, Vital Signs


Vital Signs: 


 Last Vital Signs











Temp  99.0 F   04/22/18 12:44


 


Pulse  115 H  04/22/18 12:44


 


Resp  20   04/22/18 12:44


 


BP  144/80   04/22/18 12:44


 


Pulse Ox  99   04/22/18 16:57














- Medical History


PMH: Asthma, HTN


   Denies: Chronic Kidney Disease





- Family History


Family History: States: Unknown Family Hx





- Home Medications


Home Medications: 


 Ambulatory Orders











 Medication  Instructions  Recorded


 


Albuterol HFA [Ventolin HFA 90 2 puff IH Q6 PRN 04/16/18





mcg/actuation (8 g)]  


 


Buprenorphine HCl/Naloxone HCl 1 film SL Q12 04/16/18





[Suboxone 8 mg-2 mg Sl Film]  


 


Metoprolol Tartrate [Lopressor] 50 mg PO Q12 04/16/18


 


Montelukast [Singulair] 10 mg PO HS 04/16/18


 


Omeprazole 40 mg PO DAILY 04/16/18


 


Acidoph/L.bulg/Bif.b/S.thermop 1 tab PO BID #28 tab 04/17/18





[Bacid Probiotic 5%-80%-10%-5%]  


 


Apixaban [Eliquis] 5 mg PO BID #60 tab 04/17/18


 


Clindamycin [Cleocin] 300 mg PO Q6 #28 cap 04/17/18














- Allergies


Allergies/Adverse Reactions: 


 Allergies











Allergy/AdvReac Type Severity Reaction Status Date / Time


 


No Known Allergies Allergy   Verified 06/12/16 11:38














Review of Systems


ROS Statement: Except As Marked, All Systems Reviewed And Found Negative


Cardiovascular: Negative for: Chest Pain


Gastrointestinal: Positive for: Constipation.  Negative for: Vomiting, 

Abdominal Pain, Diarrhea


Neurological: Positive for: Headache





Physical Exam





- Reviewed


Nursing Documentation Reviewed: Yes





- Physical Exam


Appears: Positive for: Non-toxic, No Acute Distress


Head Exam: Positive for: ATRAUMATIC, NORMAL INSPECTION, NORMOCEPHALIC


Skin: Positive for: Normal Color, Warm, Dry


Eye Exam: Positive for: EOMI, Normal appearance, PERRL


ENT: Positive for: Normal ENT Inspection


Neck: Positive for: Normal, Painless ROM, Supple.  Negative for: Decreased ROM


Cardiovascular/Chest: Positive for: Regular Rate, Rhythm.  Negative for: Murmur


Respiratory: Positive for: Normal Breath Sounds.  Negative for: Decreased 

Breath Sounds, Accessory Muscle Use, Wheezing, Respiratory Distress


Gastrointestinal/Abdominal: Positive for: Normal Exam, Bowel Sounds, Soft.  

Negative for: Tenderness, Guarding, Rebound


Back: Positive for: Normal Inspection.  Negative for: L CVA Tenderness, R CVA 

Tenderness


Extremity: Positive for: Normal ROM.  Negative for: Tenderness, Pedal Edema, 

Deformity


Neurologic/Psych: Positive for: Alert, Oriented (x3).  Negative for: Motor/

Sensory Deficits





- ECG


O2 Sat by Pulse Oximetry: 99 (RA)


Pulse Ox Interpretation: Normal





- Radiology


X-Ray: Read By Radiologist





- Progress


Condition: Re-examined, Improving,but remains with symptoms





Medical Decision Making


Medical Decision Making: 


Time: 1345


Initial Impression: headache and constipation


Initial Plan:


--Head w/o contrast [CT]


--Fleet Enema 135ml


--Toradol 60mg 


--Reevaluation 





Time: 1511


PROCEDURE:  CT HEAD WITHOUT CONTRAST.





FINDINGS:





HEMORRHAGE:


No intracranial hemorrhage. 





BRAIN:


No mass effect or edema.  No atrophy or chronic microvascular ischemic changes.





VENTRICLES:


Unremarkable. No hydrocephalus. 





CALVARIUM:


Unremarkable.





PARANASAL SINUSES:


Unremarkable as visualized. No significant inflammatory changes.





MASTOID AIR CELLS:


Unremarkable as visualized. No inflammatory changes.





OTHER FINDINGS:


None.





IMPRESSION:


No acute intracranial pathology.





--------------------------------------------------------------------------------

-----------------


Scribe Attestation:


Documented by Dipti Real, acting as a scribe for Brenda Bishop MD





Provider Scribe Attestation:


All medical record entries made by the Scribe were at my direction and 

personally dictated by me. I have reviewed the chart and agree that the record 

accurately reflects my personal performance of the history, physical exam, 

medical decision making, and the department course for this patient. I have 

also personally directed, reviewed, and agree with the discharge instructions 

and disposition.





4.45p - patient is feeling better. will discharge/





Procedures





- Time-Out


Correct Patient (with visual ID + MR# on ID Band): No


Correct Procedure: No


Correct Site Marked: No


X-Ray Marked: No





Disposition





- Clinical Impression


Clinical Impression: 


 Headache, Constipation








- Patient ED Disposition


Is Patient to be Admitted: No


Doctor Will See Patient In The: Office


Counseled Patient/Family Regarding: Diagnosis





- Disposition


Referrals: 


[facility] [Provider Group]


Disposition: Routine/Home


Disposition Time: 16:45


Condition: STABLE


Instructions:  Migraine Headache (DC)


Forms:  [facility] (English), [facility] ED School/Work Excuse





- Pt Status Changed To:


Hospital Disposition Of: Observation





- POA


Present On Arrival: None


Core Measure Indicators: Code Heart

## 2018-04-22 NOTE — CT
PROCEDURE:  CT HEAD WITHOUT CONTRAST.



HISTORY:

HA after starting ELiquis for DVT



COMPARISON:

CT head dated 06/23/2014. 



TECHNIQUE:

Axial computed tomography images were obtained through the head/brain 

without intravenous contrast.  



Radiation dose:



Total exam DLP = 873.8 mGy-cm.



This CT exam was performed using one or more of the following dose 

reduction techniques: Automated exposure control, adjustment of the 

mA and/or kV according to patient size, and/or use of iterative 

reconstruction technique.



FINDINGS:



HEMORRHAGE:

No intracranial hemorrhage. 



BRAIN:

No mass effect or edema.  No atrophy or chronic microvascular 

ischemic changes.



VENTRICLES:

Unremarkable. No hydrocephalus. 



CALVARIUM:

Unremarkable.



PARANASAL SINUSES:

Unremarkable as visualized. No significant inflammatory changes.



MASTOID AIR CELLS:

Unremarkable as visualized. No inflammatory changes.



OTHER FINDINGS:

None.



IMPRESSION:

No acute intracranial pathology.

## 2018-08-26 ENCOUNTER — HOSPITAL ENCOUNTER (OUTPATIENT)
Dept: HOSPITAL 14 - H.ER | Age: 53
Setting detail: OBSERVATION
LOS: 1 days | Discharge: LEFT BEFORE DISCHARGE | End: 2018-08-27
Attending: FAMILY MEDICINE | Admitting: FAMILY MEDICINE
Payer: MEDICAID

## 2018-08-26 VITALS
HEART RATE: 53 BPM | RESPIRATION RATE: 17 BRPM | SYSTOLIC BLOOD PRESSURE: 136 MMHG | OXYGEN SATURATION: 97 % | DIASTOLIC BLOOD PRESSURE: 74 MMHG | TEMPERATURE: 98.2 F

## 2018-08-26 DIAGNOSIS — F17.200: ICD-10-CM

## 2018-08-26 DIAGNOSIS — J45.909: ICD-10-CM

## 2018-08-26 DIAGNOSIS — Z86.718: ICD-10-CM

## 2018-08-26 DIAGNOSIS — Z79.01: ICD-10-CM

## 2018-08-26 DIAGNOSIS — R51: ICD-10-CM

## 2018-08-26 DIAGNOSIS — I10: ICD-10-CM

## 2018-08-26 DIAGNOSIS — R07.89: Primary | ICD-10-CM

## 2018-08-26 DIAGNOSIS — R06.02: ICD-10-CM

## 2018-08-26 LAB
ALBUMIN SERPL-MCNC: 3.9 G/DL (ref 3.5–5)
ALBUMIN/GLOB SERPL: 1.1 {RATIO} (ref 1–2.1)
ALT SERPL-CCNC: 35 U/L (ref 21–72)
AST SERPL-CCNC: 29 U/L (ref 17–59)
BASOPHILS # BLD AUTO: 0 K/UL (ref 0–0.2)
BASOPHILS NFR BLD: 0.4 % (ref 0–2)
BUN SERPL-MCNC: 21 MG/DL (ref 9–20)
CALCIUM SERPL-MCNC: 9.1 MG/DL (ref 8.4–10.2)
D DIMER: 374 NG/MLDDU (ref 0–230)
EOSINOPHIL # BLD AUTO: 0.2 K/UL (ref 0–0.7)
EOSINOPHIL NFR BLD: 3.6 % (ref 0–4)
ERYTHROCYTE [DISTWIDTH] IN BLOOD BY AUTOMATED COUNT: 16.5 % (ref 11.5–14.5)
GFR NON-AFRICAN AMERICAN: > 60
HGB BLD-MCNC: 13.3 G/DL (ref 12–18)
INR PPP: 0.9
LYMPHOCYTES # BLD AUTO: 2.5 K/UL (ref 1–4.3)
LYMPHOCYTES NFR BLD AUTO: 42.9 % (ref 20–40)
MCH RBC QN AUTO: 27.6 PG (ref 27–31)
MCHC RBC AUTO-ENTMCNC: 33.6 G/DL (ref 33–37)
MCV RBC AUTO: 82 FL (ref 80–94)
MONOCYTES # BLD: 0.4 K/UL (ref 0–0.8)
MONOCYTES NFR BLD: 6.5 % (ref 0–10)
NEUTROPHILS # BLD: 2.7 K/UL (ref 1.8–7)
NEUTROPHILS NFR BLD AUTO: 46.6 % (ref 50–75)
NRBC BLD AUTO-RTO: 0.1 % (ref 0–0)
PLATELET # BLD: 195 K/UL (ref 130–400)
PMV BLD AUTO: 8.4 FL (ref 7.2–11.7)
PROTHROMBIN TIME: 9.8 SECONDS (ref 9.8–13.1)
RBC # BLD AUTO: 4.82 MIL/UL (ref 4.4–5.9)
WBC # BLD AUTO: 5.9 K/UL (ref 4.8–10.8)

## 2018-08-26 PROCEDURE — 83992 ASSAY FOR PHENCYCLIDINE: CPT

## 2018-08-26 PROCEDURE — 71275 CT ANGIOGRAPHY CHEST: CPT

## 2018-08-26 PROCEDURE — 80349 CANNABINOIDS NATURAL: CPT

## 2018-08-26 PROCEDURE — 80361 OPIATES 1 OR MORE: CPT

## 2018-08-26 PROCEDURE — 84484 ASSAY OF TROPONIN QUANT: CPT

## 2018-08-26 PROCEDURE — 93005 ELECTROCARDIOGRAM TRACING: CPT

## 2018-08-26 PROCEDURE — 85025 COMPLETE CBC W/AUTO DIFF WBC: CPT

## 2018-08-26 PROCEDURE — 80053 COMPREHEN METABOLIC PANEL: CPT

## 2018-08-26 PROCEDURE — 80358 DRUG SCREENING METHADONE: CPT

## 2018-08-26 PROCEDURE — 80345 DRUG SCREENING BARBITURATES: CPT

## 2018-08-26 PROCEDURE — 80346 BENZODIAZEPINES1-12: CPT

## 2018-08-26 PROCEDURE — 85378 FIBRIN DEGRADE SEMIQUANT: CPT

## 2018-08-26 PROCEDURE — 71046 X-RAY EXAM CHEST 2 VIEWS: CPT

## 2018-08-26 PROCEDURE — 80324 DRUG SCREEN AMPHETAMINES 1/2: CPT

## 2018-08-26 PROCEDURE — 99285 EMERGENCY DEPT VISIT HI MDM: CPT

## 2018-08-26 PROCEDURE — 80353 DRUG SCREENING COCAINE: CPT

## 2018-08-26 PROCEDURE — 85610 PROTHROMBIN TIME: CPT

## 2018-08-26 NOTE — RAD
Date of service: 



08/26/2018



HISTORY:

Chest pain  



COMPARISON:

Comparison chest 06/12/2016



TECHNIQUE:

Chest PA and lateral



FINDINGS:



LUNGS:

Mild bibasilar atelectasis and or scarring changes left greater than 

right



PLEURA:

No significant pleural effusion identified. No pneumothorax apparent.



CARDIOVASCULAR:

Normal.



OSSEOUS STRUCTURES:

No significant abnormalities.



VISUALIZED UPPER ABDOMEN:

Normal.



OTHER FINDINGS:

None.



IMPRESSION:

Mild bibasilar atelectasis and or scarring changes left greater than 

right

## 2018-08-26 NOTE — ED PDOC
HPI:  Headache


Time Seen by Provider: 08/26/18 08:10


Chief Complaint (Nursing): Headache


Chief Complaint (Provider): Headache


History Per: Patient


History/Exam Limitations: no limitations


Onset/Duration Of Symptoms: Days


Associated Symptoms: denies: Nausea, Vomiting


Additional Complaint(s): 


53 years old male with history of DVT presents to the ED complaining of 

shortness of breath, chest tightness, diaphoresis and headache onset last 

night. Patient reports he stopped taking his eliquis and restarted it 2 days 

ago. He denies any nausea or vomiting. 





PMD: Parker Mejia





Past Medical History


Reviewed: Historical Data, Nursing Documentation, Vital Signs


Vital Signs: 





 Last Vital Signs











Temp  97.9 F   08/26/18 08:14


 


Pulse  48 L  08/26/18 08:28


 


Resp  19   08/26/18 08:28


 


BP  144/66   08/26/18 08:28


 


Pulse Ox  97   08/26/18 08:28














- Medical History


PMH: Asthma, Deep Vein Thrombosis, HTN


   Denies: Chronic Kidney Disease





- Surgical History


Surgical History: No Surg Hx





- Family History


Family History: States: Unknown Family Hx





- Social History


Current smoker - smoking cessation education provided: Yes (Heavy)


Alcohol: None


Drugs: Denies





- Home Medications


Home Medications: 


 Ambulatory Orders











 Medication  Instructions  Recorded


 


Albuterol HFA [Ventolin HFA 90 2 puff IH Q6 PRN 04/16/18





mcg/actuation (8 g)]  


 


Buprenorphine HCl/Naloxone HCl 1 film SL Q12 04/16/18





[Suboxone 8 mg-2 mg Sl Film]  


 


Metoprolol Tartrate [Lopressor] 50 mg PO Q12 04/16/18


 


Montelukast [Singulair] 10 mg PO HS 04/16/18


 


Omeprazole 40 mg PO DAILY 04/16/18


 


Acidoph/L.bulg/Bif.b/S.thermop 1 tab PO BID #28 tab 04/17/18





[Bacid Probiotic 5%-80%-10%-5%]  


 


Apixaban [Eliquis] 5 mg PO BID #60 tab 04/17/18


 


Clindamycin [Cleocin] 300 mg PO Q6 #28 cap 04/17/18


 


Naloxegol Oxalate [Movantik] 25 mg PO DAILY #30 tablet 04/22/18


 


Buprenorphine HCl/Naloxone HCl 1 each SL BID 08/26/18





[Suboxone 2 mg-0.5 mg Sl Film]  


 


Montelukast Sodium [Singulair] 10 mg PO HS 08/26/18














- Allergies


Allergies/Adverse Reactions: 


 Allergies











Allergy/AdvReac Type Severity Reaction Status Date / Time


 


No Known Allergies Allergy   Verified 06/12/16 11:38














Review of Systems


ROS Statement: Except As Marked, All Systems Reviewed And Found Negative


Cardiovascular: Positive for: Other (Chest tightness)


Respiratory: Positive for: Shortness of Breath


Gastrointestinal: Negative for: Nausea, Vomiting


Neurological: Positive for: Headache





Physical Exam





- Reviewed


Nursing Documentation Reviewed: Yes


Vital Signs Reviewed: Yes





- Physical Exam


Appears: Positive for: Non-toxic, No Acute Distress


Head Exam: Positive for: ATRAUMATIC, NORMOCEPHALIC


Cardiovascular/Chest: Positive for: Regular Rate, Rhythm.  Negative for: Murmur


Respiratory: Positive for: Decreased Breath Sounds


Gastrointestinal/Abdominal: Positive for: Normal Exam, Soft.  Negative for: 

Tenderness


Extremity: Positive for: Normal ROM.  Negative for: Calf Tenderness


Neurologic/Psych: Positive for: Alert, Oriented (x3)





- Laboratory Results


Result Diagrams: 


 08/26/18 08:48





 08/26/18 08:48





- ECG


O2 Sat by Pulse Oximetry: 97 (RA)


Pulse Ox Interpretation: Normal





Medical Decision Making


Medical Decision Making: 


Time: 0827





Initial Plan:


--EKG


--CMP


--Urine drug screen


--Urine dipstick


--CBC


--D Dimer (COAG)


--PT/INR


--Chest X-Ray





1007


Chest X-Ray FINDINGS:





LUNGS:


Mild bibasilar atelectasis and or scarring changes left greater than right





PLEURA:


No significant pleural effusion identified. No pneumothorax apparent.





CARDIOVASCULAR:


Normal.





OSSEOUS STRUCTURES:


No significant abnormalities.





VISUALIZED UPPER ABDOMEN:


Normal.





OTHER FINDINGS:


None.





IMPRESSION:


Mild bibasilar atelectasis and or scarring changes left greater than right





--------------------------------------------------------------------------------

-----------------


Scribe Attestation:


Documented by Jacquie Mcintyre, acting as a scribe for Bassem Villalta MD.





Provider Scribe Attestation:


All medical record entries made by the Scribe were at my direction and 

personally dictated by me. I have reviewed the chart and agree that the record 

accurately reflects my personal performance of the history, physical exam, 

medical decision making, and the department course for this patient. I have 

also personally directed, reviewed, and agree with the discharge instructions 

and disposition.





Disposition





- Clinical Impression


Clinical Impression: 


 Chest pain








- Patient ED Disposition


Is Patient to be Admitted: Yes





- Disposition


Referrals: 


Roberto Serrato, MARCELLA, APN [Primary Care Provider] - 


Disposition Time: 13:34


Condition: FAIR


Forms:  [facility] (English)





- Pt Status Changed To:


Hospital Disposition Of: Observation





- POA


Present On Arrival: None

## 2018-08-26 NOTE — CT
Date of service: 



08/26/2018



PROCEDURE:  CT Chest with contrast (Pulmonary Angiogram)



HISTORY:

H/o DVT SOB



COMPARISON:

Comparison made with chest radiograph earlier same day



TECHNIQUE:

Axial computed tomography images were obtained of the chest in the 

pulmonary arterial phase of enhancement. Coronal and sagittal 

reformatted images were created and reviewed.



Intravenous contrast dose: 99 cc Visipaque 320 



Radiation dose:



Total exam DLP = 410.86 mGy-cm.



This CT exam was performed using one or more of the following dose 

reduction techniques: Automated exposure control, adjustment of the 

mA and/or kV according to patient size, and/or use of iterative 

reconstruction technique.



FINDINGS:



PULMONARY ARTERIES:

The visualized pulmonary trunk, right and left main, lobar, segmental 

and proximal subsegmental branches of the pulmonary arteries are well 

opacified with no definitive filling defects seen to suggest acute 

central pulmonary embolus. Trunk measures approximately 3.3 cm. 



AORTA:

No acute findings. No thoracic aortic aneurysm. Ascending thoracic 

aorta measures approximately 3.4 cm and descending thoracic aorta 

measures approximately 2.7 cm. 



LUNGS:

There are multiple varying sized nodular densities/ opacities seen 

scattered throughout the lower lobes bilaterally as well as middle 

lobe.  There is an approximately 8 mm nodular opacity seen 

superolateral aspect right middle lobe bordering the minor fissure on 

seen on axial image series 5 number 73 another in the posterior apex 

of the middle lobe measuring approximately 7.9 axial image 73. 

Additional small 4 mm nodule posterolateral border right lower lobe 

near the pleural surface measuring 4 mm. With 2 additional lower lobe 

nodules measuring 7 mm and nearly 12 mm both seen on axial image 

number 99.  An additional pleural-based nodule posteromedial lung 

base seen on axial image number 102 abutting the hemidiaphragm.  

There may also be 3 additional nodules in the right posterior lung 

base all seen on axial image number 106. 



On the left side, there is a 4.2 mm nodule superior aspect left lower 

lobe bordering the left post lateral pleural surface and fissure seen 

on axial image 67.  Two additional small nodules in the superior 

aspect right lower lobe seen on axial image number 76 and 77 

measuring 3.8 and 4.7 mm respectively. Another 6.9 mm nodule left 

lung base image number 91 with at least 2 additional nodules slightly 

more inferiorly located measuring 9.1 and 3.5 mm respectively. .  

There are additional multiple nodular opacities in the left lung base 

seen on axial image number 103 through 108.  



Findings are of uncertain etiology and could be post 

infectious/inflammatory however metastatic disease must be excluded.  

Clinical correlation recommended.  There are linear areas of 

atelectasis/ scarring changes both lung bases including the lingular 

region. There is a small of bleb seen superomedial aspect right upper 

lobe abutting the mediastinum 



PLEURAL SPACES:

Unremarkable. No effusion or pneumothorax. 



HEART:

Heart size is mildly enlarged.  . No significant pericardial 

effusion. 



LYMPH NODES:

Few small to medium-sized mediastinal lymph nodes the largest 

measuring approximately 15.2 cm located adjacent and abutting the 

superior posterior margin of the transverse portion of the aortic 

arch and origin of the left subclavian artery. Few small nonspecific 

bilateral axillary lymph nodes are present.



BONES, CHEST WALL:

Unremarkable. No fracture or destructive lesion 



OTHER FINDINGS:

Mild changes of bilateral gynecomastia 



IMPRESSION:

No evidence of acute central pulmonary embolus.



Multiple on bilateral nodular densities scattered throughout the 

lower lobes as well as right middle lobe.  Rule out post infectious/ 

inflammatory etiology versus metastatic disease.  Pulmonology 

consultation recommended.  These findings discussed with Dr. Jones at 

approximately 12:35 p.m. with written down and read back 

verification. 



Atelectasis/scarring changes both lung bases including the lingular 

region.

## 2025-03-19 NOTE — CARD
--------------- APPROVED REPORT --------------





Date of service: 08/26/2018



EKG Measurement

Heart [id number]

OH [id number]

[id number]

[id number]

[id number]



<Conclusion>

Sinus bradycardia

Early repolarization

Otherwise normal ECG
General: NAD  PERRLA  Neurology: nonfocal   Respiratory: CTA B/L  CV: s1s2+  Abdominal: Soft, NT, ND +BS, Last BM  Extremities: edema+  Skin Normal